# Patient Record
Sex: MALE | Employment: OTHER | URBAN - METROPOLITAN AREA
[De-identification: names, ages, dates, MRNs, and addresses within clinical notes are randomized per-mention and may not be internally consistent; named-entity substitution may affect disease eponyms.]

---

## 2023-02-02 ENCOUNTER — HOSPITAL ENCOUNTER (OUTPATIENT)
Dept: CARDIOLOGY | Facility: HOSPITAL | Age: 73
Discharge: HOME | End: 2023-02-02
Attending: PHYSICAL MEDICINE & REHABILITATION | Admitting: PHYSICAL MEDICINE & REHABILITATION
Payer: OTHER GOVERNMENT

## 2023-02-02 DIAGNOSIS — I25.9 ISCHEMIC HEART DISEASE: ICD-10-CM

## 2023-02-02 LAB — LVEF ECHO: NORMAL

## 2023-02-02 PROCEDURE — 93306 TTE W/DOPPLER COMPLETE: CPT

## 2023-02-02 PROCEDURE — 93306 TTE W/DOPPLER COMPLETE: CPT | Mod: 26 | Performed by: INTERNAL MEDICINE

## 2023-11-04 ENCOUNTER — HOSPITAL ENCOUNTER (EMERGENCY)
Facility: CLINIC | Age: 73
Discharge: HOME | End: 2023-11-04
Attending: EMERGENCY MEDICINE | Admitting: EMERGENCY MEDICINE
Payer: COMMERCIAL

## 2023-11-04 VITALS
WEIGHT: 185 LBS | SYSTOLIC BLOOD PRESSURE: 154 MMHG | HEART RATE: 73 BPM | DIASTOLIC BLOOD PRESSURE: 69 MMHG | HEIGHT: 68 IN | TEMPERATURE: 97.5 F | BODY MASS INDEX: 28.04 KG/M2 | OXYGEN SATURATION: 97 % | RESPIRATION RATE: 16 BRPM

## 2023-11-04 DIAGNOSIS — N34.2 URETHRITIS: ICD-10-CM

## 2023-11-04 LAB
ALBUMIN UR-MCNC: NEGATIVE MG/DL
APPEARANCE UR: CLEAR
BILIRUB UR QL STRIP: NEGATIVE
COLOR UR AUTO: COLORLESS
GLUCOSE UR STRIP-MCNC: NEGATIVE MG/DL
HGB UR QL STRIP: ABNORMAL
KETONES UR STRIP-MCNC: NEGATIVE MG/DL
LEUKOCYTE ESTERASE UR QL STRIP: NEGATIVE
NITRATE UR QL: NEGATIVE
PH UR STRIP: 7 [PH] (ref 5–7)
RBC URINE: 3 /HPF
SP GR UR STRIP: 1.01 (ref 1–1.03)
UROBILINOGEN UR STRIP-MCNC: <2 MG/DL
WBC URINE: 1 /HPF

## 2023-11-04 PROCEDURE — 81001 URINALYSIS AUTO W/SCOPE: CPT | Performed by: EMERGENCY MEDICINE

## 2023-11-04 PROCEDURE — 99284 EMERGENCY DEPT VISIT MOD MDM: CPT

## 2023-11-04 PROCEDURE — 250N000013 HC RX MED GY IP 250 OP 250 PS 637: Performed by: EMERGENCY MEDICINE

## 2023-11-04 RX ORDER — OXYBUTYNIN CHLORIDE 5 MG/1
5 TABLET ORAL 3 TIMES DAILY
Status: DISCONTINUED | OUTPATIENT
Start: 2023-11-04 | End: 2023-11-04 | Stop reason: HOSPADM

## 2023-11-04 RX ORDER — OXYBUTYNIN CHLORIDE 5 MG/1
5 TABLET, EXTENDED RELEASE ORAL DAILY
Qty: 5 TABLET | Refills: 0 | Status: SHIPPED | OUTPATIENT
Start: 2023-11-04 | End: 2023-11-09

## 2023-11-04 RX ORDER — PHENAZOPYRIDINE HYDROCHLORIDE 100 MG/1
100 TABLET, FILM COATED ORAL ONCE
Status: COMPLETED | OUTPATIENT
Start: 2023-11-04 | End: 2023-11-04

## 2023-11-04 RX ORDER — PHENAZOPYRIDINE HYDROCHLORIDE 200 MG/1
200 TABLET, FILM COATED ORAL 3 TIMES DAILY PRN
Qty: 10 TABLET | Refills: 0 | Status: SHIPPED | OUTPATIENT
Start: 2023-11-04

## 2023-11-04 RX ADMIN — OXYBUTYNIN CHLORIDE 5 MG: 5 TABLET ORAL at 16:32

## 2023-11-04 RX ADMIN — PHENAZOPYRIDINE 100 MG: 100 TABLET ORAL at 16:21

## 2023-11-04 ASSESSMENT — ACTIVITIES OF DAILY LIVING (ADL)
ADLS_ACUITY_SCORE: 33
ADLS_ACUITY_SCORE: 33

## 2023-11-04 NOTE — ED TRIAGE NOTES
Pt had a surgery done to help with widening his urethra at the [facility] on 11/2. Since the surgery the pt has been having painful bladder spasm. He last took tylenol at 1200. He is still making urine and the catheter in place is draining.      Triage Assessment (Adult)       Row Name 11/04/23 1447          Triage Assessment    Airway WDL WDL        Respiratory WDL    Respiratory WDL WDL        Skin Circulation/Temperature WDL    Skin Circulation/Temperature WDL WDL        Cardiac WDL    Cardiac WDL WDL        Peripheral/Neurovascular WDL    Peripheral Neurovascular WDL WDL        Cognitive/Neuro/Behavioral WDL    Cognitive/Neuro/Behavioral WDL WDL

## 2023-11-04 NOTE — DISCHARGE INSTRUCTIONS
Urinalysis did not show infection  You have been prescribed Pyridium for anesthesia of your urethra  Oxybutynin for bladder spasm

## 2023-11-04 NOTE — ED PROVIDER NOTES
"EMERGENCY DEPARTMENT ENCOUNTER            IMPRESSION:  Irritable voiding symptoms secondary to indwelling Gruber cath        MEDICAL DECISION MAKING:  It was my pleasure to provide care for Stanley Briseno who presented for evaluation of urinary symptoms following postop surgery.  Patient has indwelling Gruber catheter    On my exam patient is pleasant and cooperative.   Vital signs are normal.  Physical exam notable for patient has benign abdominal exam.  There is indwelling Gruber catheter that is draining properly.     Pyridium and oxybutynin administered for symptom relief.  Patient's symptoms improved.     Laboratory investigation independently interpreted and notable for urinalysis negative for infection    ED evaluation is consistent with throat irritation from Gruber catheter    Patient was reevaluated and results were discussed.  I recommended discharge home with oxybutynin and Pyridium.  He has follow-up within 48 hours      Prior to making a final disposition on this patient the results of patient's tests and other diagnostic studies were discussed with the patient. All questions were answered. Patient expressed understanding of the plan and was amenable.    Return precautions and follow-up were discussed.     =================================================================  CHIEF COMPLAINT:  Chief Complaint   Patient presents with    Post-op Problem    Urinary Frequency         HPI  Stanley Briseno is a 73 year old male with a history of acute lower back pain, allergic rhinitis, hypothyroidism, hypertension, hyperlipidemia, GERD, adenomatous polyp of colon,  and benign prostatic hyperplasia, who presents to the ED by walk-in for evaluation of post-op problem and urine frequency.     Patient reports that he had a surgery done to his urethra at the [facility] on Thursday (11/2). States during the surgery, they \"burned\" the bottom of his bladder and removed scar tissue, reporting after the surgery, he was in " rough shape. Notes post-surgery, he has been having constant bladder spasms for the past few days, noting he feels a strong urge to urinate due to bladder spasms. [facility], but notes he spoke to a triage nurse that advised him to go to the ED. He states he is still making urine, the catheter is draining, and he is able to empty it. He has an upcoming appointment on 11/6/23. No other reported complaints or concerns at this time.    REVIEW OF SYSTEMS  Constitutional: Does not report chills, unintentional weight loss or fatigue   Eyes: Does not report visual changes or discharge    HENT: Does not report sore throat, ear pain or neck pain  Respiratory: Does not report cough or shortness of breath    Cardiovascular: Does not report chest pain, palpitations or leg swelling  GI: Does not report abdominal pain, nausea, vomiting, or dark, bloody stools.    : Does not report hematuria, dysuria, or flank pain. Positive for urine urgency.  Musculoskeletal: Does not report any new musculoskeletal pain or new muscle/joint pains  Skin: Does not report rash or wound  Neurologic: Does not report current headache, new weakness, focal weakness, or sensory changes        Remainder of systems reviewed, unless noted in HPI all others negative.      PAST MEDICAL HISTORY:  History reviewed. No pertinent past medical history.    PAST SURGICAL HISTORY:  No past surgical history on file.      CURRENT MEDICATIONS:    oxyBUTYnin ER (DITROPAN XL) 5 MG 24 hr tablet  phenazopyridine (PYRIDIUM) 200 MG tablet        ALLERGIES:  Allergies   Allergen Reactions    Lisinopril Muscle Pain (Myalgia)       FAMILY HISTORY:  No family history on file.    SOCIAL HISTORY:   Social History     Socioeconomic History    Marital status:      Spouse name: None    Number of children: None    Years of education: None    Highest education level: None       PHYSICAL EXAM:    BP (!) 191/95   Pulse 73   Temp  "97.5  F (36.4  C) (Temporal)   Resp 16   Ht 1.727 m (5' 8\")   Wt 83.9 kg (185 lb)   SpO2 97%   BMI 28.13 kg/m      Constitutional: Awake, alert, he is slightly uncomfortable  Cardiovascular: Regular rate and rhythm.  Good overall perfusion.  Upper and lower extremity pulses are equal.  GI: Gruber catheter in place.  Draining properly.  No evidence of inflammation  Back: No CVA tenderness.    Musculoskeletal: Moves all 4 extremities equally, full function and capacity no peripheral edema.   Integument: Warm, dry. No rash. No bruising or petechiae.  Neurologic: Alert & oriented x 3. Normal speech.   Psychiatric: Normal mood and affect.  Appropriate judgement.    ED COURSE:  3:35 PM Met with the patient and performed my initial exam.  4:43 PM Updated patient on lab and UA results.        Medical Decision Making    History:  Supplemental history from: Family, care center, paramedics: Patient  External Record(s) reviewed: External medical records including care everywhere reviewed: N/A    Work Up:  EKG, laboratory and imaging studies as ordered were independently interpreted by myself.   Broad differential diagnosis considered for irritable and symptoms  The patient's presentation was of moderate complexity.     Complicating factors:  Patient has a complicated past medical history including: Hypothyroidism, hypertension, hyperlipidemia, GERD, and benign prostatic hyperplasia.  Care affected by social determinants of health: Access to primary care    Disposition involved shared decision-making with the patient.    The patient was prescribed medication oxybutynin and Pyridium     Patient otherwise to continue outpatient medications as prescribed.       LAB:  Laboratory results were independently reviewed and interpreted  Results for orders placed or performed during the hospital encounter of 11/04/23   UA with Microscopic reflex to Culture    Specimen: Urine, Catheter   Result Value Ref Range    Color Urine Colorless " Colorless, Straw, Light Yellow, Yellow    Appearance Urine Clear Clear    Glucose Urine Negative Negative mg/dL    Bilirubin Urine Negative Negative    Ketones Urine Negative Negative mg/dL    Specific Gravity Urine 1.006 1.001 - 1.030    Blood Urine 1.0 mg/dL (A) Negative    pH Urine 7.0 5.0 - 7.0    Protein Albumin Urine Negative Negative mg/dL    Urobilinogen Urine <2.0 <2.0 mg/dL    Nitrite Urine Negative Negative    Leukocyte Esterase Urine Negative Negative    RBC Urine 3 (H) <=2 /HPF    WBC Urine 1 <=5 /HPF           MEDICATIONS GIVEN IN THE EMERGENCY:  Medications   oxyBUTYnin (DITROPAN) tablet 5 mg (5 mg Oral $Given 11/4/23 1632)   phenazopyridine (PYRIDIUM) tablet 100 mg (100 mg Oral $Given 11/4/23 1621)           NEW PRESCRIPTIONS STARTED AT TODAY'S ER VISIT:  New Prescriptions    OXYBUTYNIN ER (DITROPAN XL) 5 MG 24 HR TABLET    Take 1 tablet (5 mg) by mouth daily for 5 days    PHENAZOPYRIDINE (PYRIDIUM) 200 MG TABLET    Take 1 tablet (200 mg) by mouth 3 times daily as needed for irritation                FINAL DIAGNOSIS:    ICD-10-CM    1. Urethritis  N34.2                  NAME: Stanley Briseno  AGE: 73 year old male  YOB: 1950  MRN: [id number]  EVALUATION DATE & TIME: 11/4/2023  2:55 PM    PCP: Carlos Shaw    ED PROVIDER: LA Zacarias Yasmin Hassan, am serving as a scribe to document services personally performed by Dr. Josué Landaverde based on my observation and the provider's statements to me. IJosué MD attest that Etta Guerrero is acting in a scribe capacity, has observed my performance of the services and has documented them in accordance with my direction.    Josué Landaverde M.D.  Emergency Medicine  [facility] EMERGENCY ROOM  [address] [id number]  [phone]  Dept: [phone]  11/4/2023         Josué Landaverde MD  11/07/23 9953